# Patient Record
Sex: MALE | Race: WHITE | NOT HISPANIC OR LATINO | Employment: OTHER | URBAN - METROPOLITAN AREA
[De-identification: names, ages, dates, MRNs, and addresses within clinical notes are randomized per-mention and may not be internally consistent; named-entity substitution may affect disease eponyms.]

---

## 2022-10-11 ENCOUNTER — OFFICE VISIT (OUTPATIENT)
Dept: GASTROENTEROLOGY | Facility: CLINIC | Age: 57
End: 2022-10-11

## 2022-10-11 ENCOUNTER — PREP FOR SURGERY (OUTPATIENT)
Dept: SURGERY | Facility: SURGERY CENTER | Age: 57
End: 2022-10-11

## 2022-10-11 VITALS
OXYGEN SATURATION: 98 % | HEART RATE: 66 BPM | TEMPERATURE: 97.3 F | DIASTOLIC BLOOD PRESSURE: 80 MMHG | HEIGHT: 70 IN | SYSTOLIC BLOOD PRESSURE: 138 MMHG | BODY MASS INDEX: 25.01 KG/M2 | WEIGHT: 174.7 LBS

## 2022-10-11 DIAGNOSIS — R19.4 CHANGE IN BOWEL HABIT: ICD-10-CM

## 2022-10-11 DIAGNOSIS — R19.4 CHANGE IN BOWEL HABITS: Primary | ICD-10-CM

## 2022-10-11 DIAGNOSIS — Z12.11 ENCOUNTER FOR SCREENING FOR MALIGNANT NEOPLASM OF COLON: Primary | ICD-10-CM

## 2022-10-11 DIAGNOSIS — Z12.11 ENCOUNTER FOR SCREENING FOR MALIGNANT NEOPLASM OF COLON: ICD-10-CM

## 2022-10-11 DIAGNOSIS — K64.9 HEMORRHOIDS, UNSPECIFIED HEMORRHOID TYPE: ICD-10-CM

## 2022-10-11 PROCEDURE — 99203 OFFICE O/P NEW LOW 30 MIN: CPT | Performed by: NURSE PRACTITIONER

## 2022-10-11 RX ORDER — SODIUM CHLORIDE, SODIUM LACTATE, POTASSIUM CHLORIDE, CALCIUM CHLORIDE 600; 310; 30; 20 MG/100ML; MG/100ML; MG/100ML; MG/100ML
30 INJECTION, SOLUTION INTRAVENOUS CONTINUOUS PRN
Status: CANCELLED | OUTPATIENT
Start: 2022-10-11

## 2022-10-11 RX ORDER — SODIUM CHLORIDE 0.9 % (FLUSH) 0.9 %
3 SYRINGE (ML) INJECTION EVERY 12 HOURS SCHEDULED
Status: CANCELLED | OUTPATIENT
Start: 2022-10-11

## 2022-10-11 RX ORDER — TAMSULOSIN HYDROCHLORIDE 0.4 MG/1
CAPSULE ORAL
COMMUNITY
Start: 2016-10-11

## 2022-10-11 RX ORDER — SODIUM CHLORIDE 0.9 % (FLUSH) 0.9 %
10 SYRINGE (ML) INJECTION AS NEEDED
Status: CANCELLED | OUTPATIENT
Start: 2022-10-11

## 2022-10-11 RX ORDER — SUMATRIPTAN 100 MG/1
TABLET, FILM COATED ORAL
COMMUNITY
Start: 2000-10-11

## 2022-10-11 RX ORDER — LOTEPREDNOL ETABONATE AND TOBRAMYCIN 5; 3 MG/ML; MG/ML
SUSPENSION/ DROPS OPHTHALMIC
Status: ON HOLD | COMMUNITY
Start: 2022-07-22 | End: 2022-12-13

## 2022-10-11 NOTE — PATIENT INSTRUCTIONS
Check lab work today.    Schedule colonoscopy for further evaluation.     Recommend starting a daily probiotic.  Recommend Align or Mandelbrot Project available over-the-counter.  Take 1 capsule daily with food.     Recommend continuing psyllium fiber daily, try to increase to 2 teaspoons daily.

## 2022-10-11 NOTE — PROGRESS NOTES
"Chief Complaint   Patient presents with   • GI Problem           History of Present Illness  Patient is a 57-year-old male who presents today for evaluation.    Patient presents today with concerns about change in bowel habits.  He reports this began around 2 to 3 years ago.  He reports stool is often loose and comes out thin pieces.  He does not feel he ever has a normal bowel movement.  He generally has between 0 and 3 bowel movements per day.  At times it takes him a prolonged period of time to evacuate which will irritate his hemorrhoids.  Reports he has a hemorrhoid that prolapses that he has to manually reduce after evacuation.  He reports no blood in the stool.  He at times has had nocturnal stools and bloating associated with this.    Denies any abdominal pain.    He tried psyllium fiber which seemed to help somewhat initially.  He is taking 1 teaspoon daily.  Questions if he needs to take more.    He denies any heartburn, reflux, nausea, or vomiting.    Denies any family history of colon cancer or GI disorders.    He has had a prior hernia repair.  His last colonoscopy was around 10 years ago and was normal.     Result Review :       COMPREHENSIVE METABOLIC PANEL (03/31/2022 09:28)   CBC AND DIFFERENTIAL (03/31/2022 09:28)       Vital Signs:   /80   Pulse 66   Temp 97.3 °F (36.3 °C)   Ht 177.8 cm (70\")   Wt 79.2 kg (174 lb 11.2 oz)   SpO2 98%   BMI 25.07 kg/m²     Body mass index is 25.07 kg/m².     Physical Exam  Vitals reviewed.   Constitutional:       General: He is not in acute distress.     Appearance: He is well-developed.   HENT:      Head: Normocephalic and atraumatic.   Pulmonary:      Effort: Pulmonary effort is normal. No respiratory distress.   Abdominal:      General: Abdomen is flat. Bowel sounds are normal. There is no distension.      Palpations: Abdomen is soft.      Tenderness: There is no abdominal tenderness.   Genitourinary:     Comments: Patient deferred rectal exam, will " be performed at time of colonoscopy.  Skin:     General: Skin is dry.      Coloration: Skin is not pale.   Neurological:      Mental Status: He is alert and oriented to person, place, and time.   Psychiatric:         Thought Content: Thought content normal.           Assessment and Plan    Diagnoses and all orders for this visit:    1. Change in bowel habits (Primary)  -     Celiac Disease Panel  -     TSH Rfx On Abnormal To Free T4    2. Encounter for screening for malignant neoplasm of colon    3. Hemorrhoids, unspecified hemorrhoid type         Discussion  Patient presents today for evaluation of change in bowel habits.  Recommended lab work today to assess for celiac disease and to assess thyroid.  He is due for colonoscopy for screening purposes which we will arrange and which will allow for further evaluation of symptoms as well.  Recommended increasing fiber supplementation and trial of probiotic.  If no improvement, may consider treatment with Xifaxan for IBS-D.          Follow Up   Return for Follow up to review results after testing complete.    Patient Instructions   Check lab work today.    Schedule colonoscopy for further evaluation.     Recommend starting a daily probiotic.  Recommend Align or imagine available over-the-counter.  Take 1 capsule daily with food.     Recommend continuing psyllium fiber daily, try to increase to 2 teaspoons daily.

## 2022-10-12 LAB
ENDOMYSIUM IGA SER QL: NEGATIVE
IGA SERPL-MCNC: 188 MG/DL (ref 90–386)
TSH SERPL DL<=0.005 MIU/L-ACNC: 3.03 UIU/ML (ref 0.27–4.2)
TTG IGA SER-ACNC: <2 U/ML (ref 0–3)

## 2022-12-09 NOTE — SIGNIFICANT NOTE
Education provided the Patient on the following:    - Nothing to Eat or Drink after MN the night before the procedure    - Avoid red/purple fluids while completing their bowel prep as ordered by physician  -Contact Gastrointerologist office for any questions about specific details regarding colon prep    -You will need to have someone drive you home after your colonoscopy and remain with you for 24 hours after the procedure  - The date of your Surgery, you may have one visitor at bedside or within 10-15 minutes of [facility]  -Please wear warm socks when you arrive for your colonoscopy  -Remove all jewelry and leave any valuables before arriving the day of your procedure (all will have to be removed before leaving preop)  -You will need to arrive at 1100 on 12/13/22 for your colonoscopy    -Feel free to contact us at: [phone] with any additional questions/concerns

## 2022-12-13 ENCOUNTER — ANESTHESIA (OUTPATIENT)
Dept: SURGERY | Facility: SURGERY CENTER | Age: 57
End: 2022-12-13

## 2022-12-13 ENCOUNTER — HOSPITAL ENCOUNTER (OUTPATIENT)
Facility: SURGERY CENTER | Age: 57
Setting detail: HOSPITAL OUTPATIENT SURGERY
Discharge: HOME | End: 2022-12-13
Attending: INTERNAL MEDICINE | Admitting: INTERNAL MEDICINE

## 2022-12-13 ENCOUNTER — ANESTHESIA EVENT (OUTPATIENT)
Dept: SURGERY | Facility: SURGERY CENTER | Age: 57
End: 2022-12-13

## 2022-12-13 VITALS
HEART RATE: 47 BPM | HEIGHT: 70 IN | TEMPERATURE: 98.4 F | BODY MASS INDEX: 24.05 KG/M2 | OXYGEN SATURATION: 99 % | WEIGHT: 168 LBS | SYSTOLIC BLOOD PRESSURE: 105 MMHG | RESPIRATION RATE: 16 BRPM | DIASTOLIC BLOOD PRESSURE: 70 MMHG

## 2022-12-13 DIAGNOSIS — R19.4 CHANGE IN BOWEL HABIT: ICD-10-CM

## 2022-12-13 DIAGNOSIS — Z12.11 ENCOUNTER FOR SCREENING FOR MALIGNANT NEOPLASM OF COLON: ICD-10-CM

## 2022-12-13 PROCEDURE — 45380 COLONOSCOPY AND BIOPSY: CPT | Performed by: INTERNAL MEDICINE

## 2022-12-13 PROCEDURE — 25010000002 PROPOFOL 10 MG/ML EMULSION: Performed by: ANESTHESIOLOGY

## 2022-12-13 PROCEDURE — 88305 TISSUE EXAM BY PATHOLOGIST: CPT | Performed by: INTERNAL MEDICINE

## 2022-12-13 PROCEDURE — 45385 COLONOSCOPY W/LESION REMOVAL: CPT | Performed by: INTERNAL MEDICINE

## 2022-12-13 RX ORDER — SODIUM CHLORIDE, SODIUM LACTATE, POTASSIUM CHLORIDE, CALCIUM CHLORIDE 600; 310; 30; 20 MG/100ML; MG/100ML; MG/100ML; MG/100ML
30 INJECTION, SOLUTION INTRAVENOUS CONTINUOUS PRN
Status: DISCONTINUED | OUTPATIENT
Start: 2022-12-13 | End: 2022-12-13 | Stop reason: HOSPADM

## 2022-12-13 RX ORDER — LIDOCAINE HYDROCHLORIDE 20 MG/ML
INJECTION, SOLUTION INFILTRATION; PERINEURAL AS NEEDED
Status: DISCONTINUED | OUTPATIENT
Start: 2022-12-13 | End: 2022-12-13 | Stop reason: SURG

## 2022-12-13 RX ORDER — CHOLECALCIFEROL (VITAMIN D3) 25 MCG
1000 CAPSULE ORAL DAILY
COMMUNITY

## 2022-12-13 RX ORDER — SODIUM CHLORIDE, SODIUM LACTATE, POTASSIUM CHLORIDE, CALCIUM CHLORIDE 600; 310; 30; 20 MG/100ML; MG/100ML; MG/100ML; MG/100ML
INJECTION, SOLUTION INTRAVENOUS CONTINUOUS PRN
Status: DISCONTINUED | OUTPATIENT
Start: 2022-12-13 | End: 2022-12-13 | Stop reason: SURG

## 2022-12-13 RX ORDER — CHLORAL HYDRATE 500 MG
1000 CAPSULE ORAL
COMMUNITY

## 2022-12-13 RX ORDER — SODIUM CHLORIDE 0.9 % (FLUSH) 0.9 %
3 SYRINGE (ML) INJECTION EVERY 12 HOURS SCHEDULED
Status: DISCONTINUED | OUTPATIENT
Start: 2022-12-13 | End: 2022-12-13 | Stop reason: HOSPADM

## 2022-12-13 RX ORDER — SODIUM CHLORIDE 0.9 % (FLUSH) 0.9 %
10 SYRINGE (ML) INJECTION AS NEEDED
Status: DISCONTINUED | OUTPATIENT
Start: 2022-12-13 | End: 2022-12-13 | Stop reason: HOSPADM

## 2022-12-13 RX ORDER — MAGNESIUM HYDROXIDE 1200 MG/15ML
LIQUID ORAL AS NEEDED
Status: DISCONTINUED | OUTPATIENT
Start: 2022-12-13 | End: 2022-12-13 | Stop reason: HOSPADM

## 2022-12-13 RX ORDER — PROPOFOL 10 MG/ML
VIAL (ML) INTRAVENOUS CONTINUOUS PRN
Status: DISCONTINUED | OUTPATIENT
Start: 2022-12-13 | End: 2022-12-13 | Stop reason: SURG

## 2022-12-13 RX ORDER — PROPOFOL 10 MG/ML
VIAL (ML) INTRAVENOUS AS NEEDED
Status: DISCONTINUED | OUTPATIENT
Start: 2022-12-13 | End: 2022-12-13 | Stop reason: SURG

## 2022-12-13 RX ADMIN — SODIUM CHLORIDE, SODIUM LACTATE, POTASSIUM CHLORIDE, AND CALCIUM CHLORIDE: .6; .31; .03; .02 INJECTION, SOLUTION INTRAVENOUS at 12:23

## 2022-12-13 RX ADMIN — SODIUM CHLORIDE, POTASSIUM CHLORIDE, SODIUM LACTATE AND CALCIUM CHLORIDE 30 ML/HR: 600; 310; 30; 20 INJECTION, SOLUTION INTRAVENOUS at 11:39

## 2022-12-13 RX ADMIN — PROPOFOL 100 MG: 10 INJECTION, EMULSION INTRAVENOUS at 12:21

## 2022-12-13 RX ADMIN — Medication 160 MCG/KG/MIN: at 12:27

## 2022-12-13 RX ADMIN — LIDOCAINE HYDROCHLORIDE 60 MG: 20 INJECTION, SOLUTION INFILTRATION; PERINEURAL at 12:21

## 2022-12-13 NOTE — ANESTHESIA POSTPROCEDURE EVALUATION
"Patient: Maxim Nettles    Procedure Summary     Date: 12/13/22 Room / Location: [facility]    Anesthesia Start: 1221 Anesthesia Stop: 1254    Procedure: COLONOSCOPY, POLYPECTOMIES Diagnosis:       Encounter for screening for malignant neoplasm of colon      Change in bowel habit      (Encounter for screening for malignant neoplasm of colon [Z12.11])      (Change in bowel habit [R19.4])    Surgeons: Baron Garza MD Provider: Roni Steele MD    Anesthesia Type: MAC ASA Status: 1          Anesthesia Type: MAC    Vitals  Vitals Value Taken Time   /66 12/13/22 1302   Temp 36.9 °C (98.4 °F) 12/13/22 1250   Pulse 47 12/13/22 1302   Resp 16 12/13/22 1302   SpO2 99 % 12/13/22 1302           Post Anesthesia Care and Evaluation    Patient location during evaluation: bedside  Patient participation: complete - patient participated  Level of consciousness: awake and alert  Pain management: adequate    Airway patency: patent  Anesthetic complications: No anesthetic complications    Cardiovascular status: acceptable  Respiratory status: acceptable  Hydration status: acceptable    Comments: /66   Pulse (!) 47   Temp 36.9 °C (98.4 °F) (Tympanic)   Resp 16   Ht 177.8 cm (70\")   Wt 76.2 kg (168 lb)   SpO2 99%   BMI 24.11 kg/m²       "

## 2022-12-13 NOTE — H&P
No chief complaint on file.      HPI  Patient today for a colonoscopy for screening and also change in bowel habits.         Problem List:    Patient Active Problem List   Diagnosis   • Encounter for screening for malignant neoplasm of colon   • Change in bowel habit       Medical History:    Past Medical History:   Diagnosis Date   • Hernia 2000        Social History:    Social History     Socioeconomic History   • Marital status:    Tobacco Use   • Smoking status: Never   Vaping Use   • Vaping Use: Never used   Substance and Sexual Activity   • Alcohol use: Yes     Alcohol/week: 5.0 standard drinks     Types: 5 Shots of liquor per week     Comment: 5/week   • Drug use: Never   • Sexual activity: Yes     Partners: Female       Family History:   Family History   Problem Relation Age of Onset   • Colon cancer Neg Hx    • Colon polyps Neg Hx    • Crohn's disease Neg Hx    • Irritable bowel syndrome Neg Hx    • Ulcerative colitis Neg Hx        Surgical History:   Past Surgical History:   Procedure Laterality Date   • COLONOSCOPY  2013   • HERNIA REPAIR  2000   • KNEE ACL RECONSTRUCTION Right    • UPPER GASTROINTESTINAL ENDOSCOPY           Current Facility-Administered Medications:   •  lactated ringers infusion, 30 mL/hr, Intravenous, Continuous PRN, Gus, Donna G, APRN  •  sodium chloride 0.9 % flush 10 mL, 10 mL, Intravenous, PRN, Gus, Donna G, APRN  •  sodium chloride 0.9 % flush 3 mL, 3 mL, Intravenous, Q12H, Gus, Donna G, APRN    Allergies: No Known Allergies     The following portions of the patient's history were reviewed by me and updated as appropriate: review of systems, allergies, current medications, past family history, past medical history, past social history, past surgical history and problem list.    There were no vitals filed for this visit.    PHYSICAL EXAM:    CONSTITUTIONAL:  today's vital signs reviewed by me  GASTROINTESTINAL: abdomen is soft nontender nondistended with normal  active bowel sounds, no masses are appreciated    Assessment/ Plan  We will proceed today with colonoscopy.    Risks and benefits as well as alternatives to endoscopic evaluation were explained to the patient and they voiced understanding and wish to proceed.  These risks include but are not limited to the risk of bleeding, perforation, adverse reaction to sedation, and missed lesions.  The patient was given the opportunity to ask questions prior to the endoscopic procedure.

## 2022-12-14 LAB
LAB AP CASE REPORT: NORMAL
LAB AP CLINICAL INFORMATION: NORMAL
PATH REPORT.FINAL DX SPEC: NORMAL
PATH REPORT.GROSS SPEC: NORMAL

## 2023-01-20 ENCOUNTER — OFFICE VISIT (OUTPATIENT)
Dept: GASTROENTEROLOGY | Facility: CLINIC | Age: 58
End: 2023-01-20
Payer: COMMERCIAL

## 2023-01-20 VITALS
HEIGHT: 70 IN | WEIGHT: 177.3 LBS | HEART RATE: 47 BPM | OXYGEN SATURATION: 100 % | TEMPERATURE: 98.4 F | SYSTOLIC BLOOD PRESSURE: 104 MMHG | DIASTOLIC BLOOD PRESSURE: 78 MMHG | BODY MASS INDEX: 25.38 KG/M2

## 2023-01-20 DIAGNOSIS — Z86.010 PERSONAL HISTORY OF COLONIC POLYPS: ICD-10-CM

## 2023-01-20 DIAGNOSIS — K64.8 INTERNAL HEMORRHOIDS: ICD-10-CM

## 2023-01-20 DIAGNOSIS — R19.4 CHANGE IN BOWEL HABITS: Primary | ICD-10-CM

## 2023-01-20 PROCEDURE — 99213 OFFICE O/P EST LOW 20 MIN: CPT

## 2023-01-20 NOTE — PATIENT INSTRUCTIONS
Continue Konsyl twice daily     For constipation:    We recommend starting MiraLAX. This is available over-the-counter and generic brand is fine.    MiraLAX works best when taken on a regular basis (daily or every other day) to help promote more regular bowel movements.    tart with 1/2  capful mixed in 8 ounces of noncarbonated liquid and take once or twice daily.    Maximum is 2 full capfuls daily.  Adjust dose based on your response.        Hemorrhoids:    Hemorrhoids are enlarged or swollen veins in the lower rectum. The most common symptoms of hemorrhoids are rectal bleeding, itching, and pain. You may be able to see or feel hemorrhoids around the outside of the anus, or they may be hidden from view, inside the rectum.    Hemorrhoids are common, occurring in both men and women. Although hemorrhoids do not usually cause serious health problems, they can be annoying and uncomfortable. Fortunately, treatments for hemorrhoids are available and can usually minimize the bothersome symptoms.     SYMPTOMS --     Hemorrhoids are more common in people who are older and in those who have diarrhea or pelvic tumors, during or after pregnancy, and in people who sit for prolonged periods of time and/or strain (push hard) to have a bowel movement.    Symptoms of hemorrhoids can include the following:  ?Painless rectal bleeding  ?Anal itching or pain  ?Tissue bulging around the anus      Itching --     Hemorrhoids commonly cause itching and irritation of skin around the anus.    Pain --     Hemorrhoids can become painful. If you develop severe pain, call your healthcare provider immediately because this may be a sign of a serious problem.      INITIAL HEMORRHOID TREATMENT --     Type of medicine Examples Notes   Stool softener Docusate sodium (sample brand names: Colace, Docu, Stool Softener) Softens bowel movements  Helps avoid straining while sitting on toilet   We recommend starting a daily fiber supplement such as Citrucel,  Metamucil, FiberCon or Benefiber.      These can be purchased over-the-counter, generic brand is fine.  Fiber supplements can take 12 to 72 hours to start working. Make sure to drink 6 to 12 ounces of water or noncarbonated beverage with fiber supplement.    Prevent hard dry stools which make hemorrhoids worse  Might reduce bleeding and other hemorrhoid symptoms  Needs to be taken with plenty of water (8 glasses of water a day)  suggest starting with a small dose and then increasing over time   Warm sitz baths     During a sitz bath, you soak the rectal area in warm water for 10 to 15 minutes two to three times daily. Sitz baths are available in most drugstores. It is also possible to use a bathtub and sit in 2 to 3 inches of warm water. Do not add soap, bubble bath, or other additives in the water. Sitz baths work by improving blood flow and relaxing the muscle around the anus, called the internal anal sphincter.     Medicines to dry or protect skin   Witch hazel (sample brand names: Tucks, Preparation H pads, Preparation H wipes)    Zinc oxide topical paste (sample brand names: Rafa's Butt Paste, Desitin)   Witch hazel wipes or unscented baby wipes can be used to clean the anus after a bowel movement    Zinc oxide also protects skin from irritation  Area must be gently cleaned and allowed to dry before using  Can apply after a sitz bath (soaking in warm, shallow water)     Steroid cream Hydrocortisone rectal cream (sample brand name: Preparation H hydrocortisone) Reduces swelling, and pain caused by hemorrhoids  Do not use for longer than a week  Do not use at all if you are pregnant unless your doctor or nurse tells you to   Medicines to shrink hemorrhoids Phenylephrine ointment or suppository (sample brand names: Preparation H, Rectacaine) Phenylephrine shrinks swollen hemorrhoids, and relieves itching and discomfort for a few hours  Area must be gently cleaned and allowed to dry before applying              Next colonoscopy due in 12/2028

## 2023-01-20 NOTE — PROGRESS NOTES
"Chief Complaint   Patient presents with   • Follow-up     Post colonoscopy           History of Present Illness    Patient is a 57 y.o. who presents to the office for follow up evaluation.  Last in office visit was on 10 11/2022 with [facility] for change in bowel habits.  Patient has a significant past medical history of hemorrhoids.    Most recent colonoscopy on 12/13/2022 with Dr. Garza remarkable for 1 tubular adenomatous polyp in the transverse colon with multiple hyperplastic polyps in the cecum, ascending, and rectum.  Also noted were moderate internal nonbleeding hemorrhoids.  Otherwise unremarkable exam.    Patient presents today to discuss results of colonoscopy evaluation and recommendations for future colon cancer screenings.    Denies upper GI symptoms that include heartburn, nausea, vomiting, or dysphagia.    Describes bowel habits as occurring 1-2 times daily  however often require straining which resulted in protrusion of internal hemorrhoid with mild discomfort and occasional bright red blood per rectum less than once per week.  Reports sensation of incomplete evacuation of stool.    Once this occurs he will insert hydrocortisone suppository until improved.  He continues to take Konsyl fiber supplementation 1-2 times per day.  Denies noticeable improvement with probiotic.    Previous treatments for loose stool include probiotics-not efficacious    No known family history of colon cancer, colon polyps, or IBD.     Result Review :       Office Visit with Donna Weber APRN (10/11/2022)  COLONOSCOPY (12/13/2022 12:14)  Tissue Pathology Exam (12/13/2022 12:27)    Vital Signs:   /78   Pulse (!) 47   Temp 98.4 °F (36.9 °C)   Ht 177.8 cm (70\")   Wt 80.4 kg (177 lb 4.8 oz)   SpO2 100%   BMI 25.44 kg/m²     Body mass index is 25.44 kg/m².     Physical Exam      Assessment and Plan    Diagnoses and all orders for this visit:    1. Change in bowel habits (Primary)    2. Personal " history of colonic polyps    3. Internal hemorrhoids           Discussion:    Patient is a pleasant 57 y.o.  who presents to the office for follow up evaluation.  Reviewed results of patient's colonoscopy evaluation at length.    For colon polyps recommended next colonoscopy for colon cancer screening be completed in 5 years or December 2028 recall in EMR.    For internal hemorrhoids, recommend continuation of conservative outpatient measures with written instructions provided to patient.  Encouraged patient to continue with Konsyl twice daily with addition of half capful of MiraLAX daily to every other day as tolerated to promote complete soft formed bowel movements.    If this regimen is no longer efficacious in preventing exacerbation of internal hemorrhoids to consider escalation in topical prescription hemorrhoidal therapy or referral to colorectal surgery for further evaluation.      Patient is agreeable to the outlined above treatment plan.  Verbalizes understanding and will contact office for any new or worsening concerns.  All questions answered and support provided.        Patient Instructions     Continue Konsyl twice daily     For constipation:    We recommend starting MiraLAX. This is available over-the-counter and generic brand is fine.    MiraLAX works best when taken on a regular basis (daily or every other day) to help promote more regular bowel movements.    tart with 1/2  capful mixed in 8 ounces of noncarbonated liquid and take once or twice daily.    Maximum is 2 full capfuls daily.  Adjust dose based on your response.        Hemorrhoids:    Hemorrhoids are enlarged or swollen veins in the lower rectum. The most common symptoms of hemorrhoids are rectal bleeding, itching, and pain. You may be able to see or feel hemorrhoids around the outside of the anus, or they may be hidden from view, inside the rectum.    Hemorrhoids are common, occurring in both men and women. Although hemorrhoids do not  usually cause serious health problems, they can be annoying and uncomfortable. Fortunately, treatments for hemorrhoids are available and can usually minimize the bothersome symptoms.     SYMPTOMS --     Hemorrhoids are more common in people who are older and in those who have diarrhea or pelvic tumors, during or after pregnancy, and in people who sit for prolonged periods of time and/or strain (push hard) to have a bowel movement.    Symptoms of hemorrhoids can include the following:  ?Painless rectal bleeding  ?Anal itching or pain  ?Tissue bulging around the anus      Itching --     Hemorrhoids commonly cause itching and irritation of skin around the anus.    Pain --     Hemorrhoids can become painful. If you develop severe pain, call your healthcare provider immediately because this may be a sign of a serious problem.      INITIAL HEMORRHOID TREATMENT --     Type of medicine Examples Notes   Stool softener Docusate sodium (sample brand names: Colace, Docu, Stool Softener) Softens bowel movements  Helps avoid straining while sitting on toilet   We recommend starting a daily fiber supplement such as Citrucel, Metamucil, FiberCon or Benefiber.      These can be purchased over-the-counter, generic brand is fine.  Fiber supplements can take 12 to 72 hours to start working. Make sure to drink 6 to 12 ounces of water or noncarbonated beverage with fiber supplement.    Prevent hard dry stools which make hemorrhoids worse  Might reduce bleeding and other hemorrhoid symptoms  Needs to be taken with plenty of water (8 glasses of water a day)  suggest starting with a small dose and then increasing over time   Warm sitz baths     During a sitz bath, you soak the rectal area in warm water for 10 to 15 minutes two to three times daily. Sitz baths are available in most drugstores. It is also possible to use a bathtub and sit in 2 to 3 inches of warm water. Do not add soap, bubble bath, or other additives in the water. Sitz baths  work by improving blood flow and relaxing the muscle around the anus, called the internal anal sphincter.     Medicines to dry or protect skin   Witch hazel (sample brand names: Tucks, Preparation H pads, Preparation H wipes)    Zinc oxide topical paste (sample brand names: Rafa's Butt Paste, Desitin)   Witch hazel wipes or unscented baby wipes can be used to clean the anus after a bowel movement    Zinc oxide also protects skin from irritation  Area must be gently cleaned and allowed to dry before using  Can apply after a sitz bath (soaking in warm, shallow water)     Steroid cream Hydrocortisone rectal cream (sample brand name: Preparation H hydrocortisone) Reduces swelling, and pain caused by hemorrhoids  Do not use for longer than a week  Do not use at all if you are pregnant unless your doctor or nurse tells you to   Medicines to shrink hemorrhoids Phenylephrine ointment or suppository (sample brand names: Preparation H, Rectacaine) Phenylephrine shrinks swollen hemorrhoids, and relieves itching and discomfort for a few hours  Area must be gently cleaned and allowed to dry before applying             Next colonoscopy due in 12/2028          EMR Dragon/Transcription Disclaimer:  This document has been Dictated utilizing Dragon dictation.

## 2024-03-11 ENCOUNTER — HOSPITAL ENCOUNTER (EMERGENCY)
Facility: HOSPITAL | Age: 59
Discharge: HOME | End: 2024-03-11
Attending: EMERGENCY MEDICINE | Admitting: EMERGENCY MEDICINE
Payer: COMMERCIAL

## 2024-03-11 VITALS
HEART RATE: 64 BPM | WEIGHT: 170 LBS | SYSTOLIC BLOOD PRESSURE: 128 MMHG | BODY MASS INDEX: 24.34 KG/M2 | TEMPERATURE: 98 F | OXYGEN SATURATION: 98 % | HEIGHT: 70 IN | RESPIRATION RATE: 16 BRPM | DIASTOLIC BLOOD PRESSURE: 78 MMHG

## 2024-03-11 DIAGNOSIS — S61.012A LACERATION OF LEFT THUMB WITHOUT FOREIGN BODY WITHOUT DAMAGE TO NAIL, INITIAL ENCOUNTER: Primary | ICD-10-CM

## 2024-03-11 PROCEDURE — 99282 EMERGENCY DEPT VISIT SF MDM: CPT

## 2024-03-11 RX ORDER — CEPHALEXIN 500 MG/1
500 CAPSULE ORAL 3 TIMES DAILY
Qty: 15 CAPSULE | Refills: 0 | Status: SHIPPED | OUTPATIENT
Start: 2024-03-11

## 2024-03-11 RX ORDER — LIDOCAINE HYDROCHLORIDE 10 MG/ML
5 INJECTION, SOLUTION EPIDURAL; INFILTRATION; INTRACAUDAL; PERINEURAL ONCE
Status: COMPLETED | OUTPATIENT
Start: 2024-03-11 | End: 2024-03-11

## 2024-03-11 RX ADMIN — LIDOCAINE HYDROCHLORIDE 5 ML: 10 INJECTION, SOLUTION EPIDURAL; INFILTRATION; INTRACAUDAL; PERINEURAL at 22:46

## 2024-03-11 NOTE — Clinical Note
[facility] [place]  [id number] [address] [id number]    Maxim Nettles was seen and treated in our emergency department on 3/11/2024.  He may return to work on 03/13/2024.         Thank you for choosing [facility].    Mary Jane Lujan PA-C

## 2024-03-12 NOTE — DISCHARGE INSTRUCTIONS
Keep wound dry for 24 hours.   Take antibiotics as prescribed until completed  Use bacitracin ointment to the wound and cover with bandaid  Follow up for any signs of infection  Return in 10 days for suture removal.
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
## (undated) DEVICE — CANN NASL CO2 TRULINK W/O2 A/

## (undated) DEVICE — Device

## (undated) DEVICE — LASSO POLYPECTOMY SNARE: Brand: LASSO

## (undated) DEVICE — VIAL FORMLN CAP 10PCT 20ML

## (undated) DEVICE — THE SINGLE USE ETRAP – POLYP TRAP IS USED FOR SUCTION RETRIEVAL OF ENDOSCOPICALLY REMOVED POLYPS.: Brand: ETRAP

## (undated) DEVICE — GOWN ,SIRUS,NONREINFORCED 3XL: Brand: MEDLINE

## (undated) DEVICE — KT ORCA ORCAPOD DISP STRL

## (undated) DEVICE — FLEX ADVANTAGE 1500CC: Brand: FLEX ADVANTAGE

## (undated) DEVICE — GOWN ISOL W/THUMB UNIV BLU BX/15

## (undated) DEVICE — SINGLE-USE BIOPSY FORCEPS: Brand: RADIAL JAW 4